# Patient Record
Sex: FEMALE | Race: WHITE | NOT HISPANIC OR LATINO | URBAN - METROPOLITAN AREA
[De-identification: names, ages, dates, MRNs, and addresses within clinical notes are randomized per-mention and may not be internally consistent; named-entity substitution may affect disease eponyms.]

---

## 2018-11-15 ENCOUNTER — IMPORTED ENCOUNTER (OUTPATIENT)
Dept: URBAN - METROPOLITAN AREA CLINIC 31 | Facility: CLINIC | Age: 73
End: 2018-11-15

## 2018-11-15 PROBLEM — E11.9: Noted: 2018-11-15

## 2018-11-15 PROBLEM — Z96.1: Noted: 2018-11-15

## 2018-11-15 PROCEDURE — 92014 COMPRE OPH EXAM EST PT 1/>: CPT

## 2018-11-15 NOTE — PATIENT DISCUSSION
1.  Pseudophakia OU - IOLs stable. Monitor. 2.  DM II without sign of Diabetic Retinopathy OU:  Discussed the pathophysiology of diabetes and its effect on the eye. Stressed the importance of regular followup and good control of BS BP and Lipids to avoid future complications. Return for an appointment in 1 year for comprehensive exam. with Dr. Kee Priest

## 2019-12-02 ENCOUNTER — IMPORTED ENCOUNTER (OUTPATIENT)
Dept: URBAN - METROPOLITAN AREA CLINIC 31 | Facility: CLINIC | Age: 74
End: 2019-12-02

## 2019-12-02 PROBLEM — H04.123: Noted: 2019-12-02

## 2019-12-02 PROBLEM — E11.9: Noted: 2019-12-02

## 2019-12-02 PROBLEM — Z96.1: Noted: 2019-12-02

## 2019-12-02 PROCEDURE — 92014 COMPRE OPH EXAM EST PT 1/>: CPT

## 2019-12-02 NOTE — PATIENT DISCUSSION
1. DM II without sign of Diabetic Retinopathy OU:  Discussed the pathophysiology of diabetes and its effect on the eye. Stressed the importance of regular followup and good control of BS BP and Lipids to avoid future complications. 2. Pseudophakia OU - IOLs stable. Monitor. 3. Dry Eye OU:  Continue current management with Artificial Tears. 4.  Return for an appointment in 1 year for comprehensive exam. with Dr. Annmarie Saleh.

## 2020-12-02 ENCOUNTER — IMPORTED ENCOUNTER (OUTPATIENT)
Dept: URBAN - METROPOLITAN AREA CLINIC 31 | Facility: CLINIC | Age: 75
End: 2020-12-02

## 2020-12-02 PROBLEM — Z96.1: Noted: 2020-12-02

## 2020-12-02 PROBLEM — H04.123: Noted: 2020-12-02

## 2020-12-02 PROBLEM — E11.9: Noted: 2020-12-02

## 2020-12-02 PROCEDURE — 92014 COMPRE OPH EXAM EST PT 1/>: CPT

## 2020-12-02 NOTE — PATIENT DISCUSSION
1. DM II without sign of Diabetic Retinopathy OU:  Stable monitor. 2. Pseudophakia OU - IOLs stable. Monitor. 3. Dry Eye OU:  Continue current management with Artificial Tears. 4.  Return for an appointment in 1 year for comprehensive exam with Dr. Roxy Reese.

## 2021-12-02 ENCOUNTER — IMPORTED ENCOUNTER (OUTPATIENT)
Dept: URBAN - METROPOLITAN AREA CLINIC 31 | Facility: CLINIC | Age: 76
End: 2021-12-02

## 2021-12-02 PROBLEM — Z96.1: Noted: 2021-12-02

## 2021-12-02 PROBLEM — E11.9: Noted: 2021-12-02

## 2021-12-02 PROBLEM — H04.123: Noted: 2021-12-02

## 2021-12-02 PROCEDURE — 99214 OFFICE O/P EST MOD 30 MIN: CPT

## 2021-12-02 PROCEDURE — 92015 DETERMINE REFRACTIVE STATE: CPT

## 2021-12-02 NOTE — PATIENT DISCUSSION
1. DM II without sign of Diabetic Retinopathy OU:  Stable monitor. 2. Pseudophakia Technis MF OU - IOLs stable. Monitor. 3. Dry Eye OU:  Continue current management with Artificial Tears. 4.  Return for an appointment in 1 year for comprehensive exam with Dr. Brigitte Ivan.

## 2021-12-02 NOTE — PATIENT DISCUSSION
1. DM II without sign of Diabetic Retinopathy OU:  Stable monitor. 2. Pseudophakia Technis MF OU - IOLs stable. Monitor. 3. Dry Eye OU:  Continue current management with Artificial Tears. 4.  Return for an appointment in 1 year for comprehensive exam with Dr. Ava Wilson.

## 2022-04-02 ASSESSMENT — VISUAL ACUITY
OS_CC: 20/25+1
OS_SC: 20/20
OD_PH: SC 20/20
OD_SC: 20/20
OS_CC: 20/20
OS_CC: 20/25-2
OS_CC: 20/20-1
OU_SC: 20/2016''
OD_CC: 20/25+3
OU_CC: 20/2016''
OD_SC: 20/20
OD_SC: 20/25
OS_SC: 20/20
OS_SC: 20/25
OD_CC: 20/40-2
OD_CC: 20/25+2
OD_CC: 20/25

## 2022-04-02 ASSESSMENT — TONOMETRY
OD_IOP_MMHG: 13
OS_IOP_MMHG: 13
OS_IOP_MMHG: 13
OD_IOP_MMHG: 13
OS_IOP_MMHG: 13
OS_IOP_MMHG: 13
OD_IOP_MMHG: 14
OD_IOP_MMHG: 13

## 2022-07-09 ENCOUNTER — TELEPHONE ENCOUNTER (OUTPATIENT)
Dept: URBAN - METROPOLITAN AREA CLINIC 121 | Facility: CLINIC | Age: 77
End: 2022-07-09

## 2022-07-10 ENCOUNTER — TELEPHONE ENCOUNTER (OUTPATIENT)
Dept: URBAN - METROPOLITAN AREA CLINIC 121 | Facility: CLINIC | Age: 77
End: 2022-07-10

## 2022-07-10 RX ORDER — ROSUVASTATIN CALCIUM 10 MG
TABLET ORAL
Refills: 0 | Status: ACTIVE | COMMUNITY
Start: 2017-12-27

## 2022-07-10 RX ORDER — METFORMIN HYDROCHLORIDE 750 MG/1
TABLET, EXTENDED RELEASE ORAL TWICE A DAY
Refills: 0 | Status: ACTIVE | COMMUNITY
Start: 2017-12-27

## 2022-07-10 RX ORDER — LISINOPRIL 10 MG/1
TABLET ORAL ONCE A DAY
Refills: 0 | Status: ACTIVE | COMMUNITY
Start: 2017-12-27

## 2022-07-10 RX ORDER — CALCIUM CITRATE/VITAMIN D3 315MG-6.25
TABLET ORAL THREE TIMES A DAY
Refills: 0 | Status: ACTIVE | COMMUNITY
Start: 2017-12-27

## 2023-01-24 ENCOUNTER — COMPREHENSIVE EXAM (OUTPATIENT)
Dept: URBAN - METROPOLITAN AREA CLINIC 29 | Facility: CLINIC | Age: 78
End: 2023-01-24

## 2023-01-24 DIAGNOSIS — E11.9: ICD-10-CM

## 2023-01-24 DIAGNOSIS — H04.123: ICD-10-CM

## 2023-01-24 PROCEDURE — 99214 OFFICE O/P EST MOD 30 MIN: CPT

## 2023-01-24 ASSESSMENT — VISUAL ACUITY
OU_SC: 20/20
OU_SC: J1
OD_SC: 20/20
OS_SC: 20/20

## 2023-01-24 ASSESSMENT — TONOMETRY
OD_IOP_MMHG: 12
OS_IOP_MMHG: 11

## 2024-01-24 ENCOUNTER — COMPREHENSIVE EXAM (OUTPATIENT)
Dept: URBAN - METROPOLITAN AREA CLINIC 29 | Facility: CLINIC | Age: 79
End: 2024-01-24

## 2024-01-24 DIAGNOSIS — E11.9: ICD-10-CM

## 2024-01-24 DIAGNOSIS — Z96.1: ICD-10-CM

## 2024-01-24 DIAGNOSIS — H04.123: ICD-10-CM

## 2024-01-24 PROCEDURE — 92014 COMPRE OPH EXAM EST PT 1/>: CPT

## 2024-01-24 ASSESSMENT — VISUAL ACUITY
OS_SC: 20/25-1
OD_SC: 20/20-1
OD_SC: 20/25-3
OS_SC: 20/20

## 2024-01-24 ASSESSMENT — TONOMETRY
OS_IOP_MMHG: 12
OD_IOP_MMHG: 11

## 2025-01-28 ENCOUNTER — COMPREHENSIVE EXAM (OUTPATIENT)
Age: 80
End: 2025-01-28

## 2025-01-28 DIAGNOSIS — H04.123: ICD-10-CM

## 2025-01-28 DIAGNOSIS — Z96.1: ICD-10-CM

## 2025-01-28 DIAGNOSIS — E11.9: ICD-10-CM

## 2025-01-28 PROCEDURE — 99214 OFFICE O/P EST MOD 30 MIN: CPT
